# Patient Record
Sex: FEMALE | Race: ASIAN
[De-identification: names, ages, dates, MRNs, and addresses within clinical notes are randomized per-mention and may not be internally consistent; named-entity substitution may affect disease eponyms.]

---

## 2018-06-06 ENCOUNTER — HOSPITAL (OUTPATIENT)
Dept: OTHER | Age: 63
End: 2018-06-06

## 2019-07-27 ENCOUNTER — HOSPITAL (OUTPATIENT)
Dept: OTHER | Age: 64
End: 2019-07-27

## 2019-07-30 LAB — PATHOLOGIST NAME: NORMAL

## 2019-09-11 ENCOUNTER — HOSPITAL (OUTPATIENT)
Dept: OTHER | Age: 64
End: 2019-09-11
Attending: INTERNAL MEDICINE

## 2019-12-12 ENCOUNTER — HOSPITAL (OUTPATIENT)
Dept: OTHER | Age: 64
End: 2019-12-12

## 2020-05-14 ENCOUNTER — HOSPITAL ENCOUNTER (OUTPATIENT)
Dept: ULTRASOUND IMAGING | Age: 65
Discharge: HOME | End: 2020-05-14
Attending: FAMILY MEDICINE
Payer: COMMERCIAL

## 2020-05-14 DIAGNOSIS — R10.11 ABDOMINAL PAIN, RIGHT UPPER QUADRANT: ICD-10-CM

## 2020-05-14 PROCEDURE — 76705 ECHO EXAM OF ABDOMEN: CPT | Performed by: FAMILY MEDICINE

## 2020-09-16 ENCOUNTER — OFFICE VISIT (OUTPATIENT)
Dept: GASTROENTEROLOGY | Facility: CLINIC | Age: 65
End: 2020-09-16
Payer: COMMERCIAL

## 2020-09-16 VITALS
HEIGHT: 60 IN | TEMPERATURE: 99 F | DIASTOLIC BLOOD PRESSURE: 82 MMHG | BODY MASS INDEX: 26.9 KG/M2 | WEIGHT: 137 LBS | SYSTOLIC BLOOD PRESSURE: 130 MMHG

## 2020-09-16 DIAGNOSIS — K82.4 POLYP OF GALLBLADDER: ICD-10-CM

## 2020-09-16 DIAGNOSIS — R10.9 RIGHT-SIDED ABDOMINAL PAIN OF UNKNOWN CAUSE: ICD-10-CM

## 2020-09-16 DIAGNOSIS — R10.11 RUQ PAIN: Primary | ICD-10-CM

## 2020-09-16 PROCEDURE — 3008F BODY MASS INDEX DOCD: CPT | Performed by: INTERNAL MEDICINE

## 2020-09-16 PROCEDURE — 3079F DIAST BP 80-89 MM HG: CPT | Performed by: INTERNAL MEDICINE

## 2020-09-16 PROCEDURE — 99244 OFF/OP CNSLTJ NEW/EST MOD 40: CPT | Performed by: INTERNAL MEDICINE

## 2020-09-16 PROCEDURE — 3075F SYST BP GE 130 - 139MM HG: CPT | Performed by: INTERNAL MEDICINE

## 2020-09-16 RX ORDER — ALENDRONATE SODIUM 70 MG/1
TABLET ORAL
COMMUNITY
Start: 2020-05-28

## 2020-09-16 RX ORDER — AMLODIPINE BESYLATE 5 MG/1
5 TABLET ORAL DAILY
COMMUNITY
Start: 2020-06-20

## 2020-09-16 RX ORDER — ATORVASTATIN CALCIUM 10 MG/1
10 TABLET, FILM COATED ORAL DAILY
COMMUNITY
Start: 2020-08-04

## 2020-09-16 NOTE — H&P
[facility] - Gastroenterology  Clinic History and Physical     Patient presents with:  Abdominal Pain: had US in May; intermittent      HPI:   Maggie Wells is a 76year old year-old female patient of Dr. Jack Roy, with history of HL, HTN, osteoporosis, pre rigors  EYES:  negative for diplopia   RESPIRATORY:  negative for severe shortness of breath  CARDIOVASCULAR:  negative for crushing sub-sternal chest pain  GASTROINTESTINAL:  see HPI  GENITOURINARY:  negative for dysuria or gross hematuria  INTEGUMENT/JUANI vessels where appropriate. Areas scanned: Targeted sonographic interrogation of the right upper quadrant was performed. FINDINGS:          LIVER:               Normal in size and parenchymal echogenicity with homogeneous echotexture.  The hepatic conto ABDOMEN+PELVIS(CONTRAST ONLY)(CPT=74177);  Future  - CT ABDOMEN+PELVIS(CONTRAST ONLY)(CPT=74177)  Mild pain, short lived and not with food  Discussed if gallbladder polyp and pain should have lap angela but unclear  Will do H pylori and celiac labs and then

## 2020-09-16 NOTE — PATIENT INSTRUCTIONS
Request labs from Dr. Claudeen Peals celiac panel and H pylori  Discuss EGD but no red flag symptoms so will hold off and pain is lower  Given pain is lower abdomen and further evaluation of liver and gallbladder will plan CT abd/pelvis with contrast

## 2020-09-17 ENCOUNTER — LAB ENCOUNTER (OUTPATIENT)
Dept: LAB | Age: 65
End: 2020-09-17
Attending: INTERNAL MEDICINE
Payer: COMMERCIAL

## 2020-09-17 DIAGNOSIS — R10.11 RUQ PAIN: ICD-10-CM

## 2020-09-17 LAB — IGA SERPL-MCNC: 234 MG/DL (ref 70–312)

## 2020-09-17 PROCEDURE — 82784 ASSAY IGA/IGD/IGG/IGM EACH: CPT

## 2020-09-17 PROCEDURE — 36415 COLL VENOUS BLD VENIPUNCTURE: CPT

## 2020-09-17 PROCEDURE — 83516 IMMUNOASSAY NONANTIBODY: CPT

## 2020-09-18 ENCOUNTER — LAB ENCOUNTER (OUTPATIENT)
Dept: LAB | Age: 65
End: 2020-09-18
Attending: INTERNAL MEDICINE
Payer: COMMERCIAL

## 2020-09-18 ENCOUNTER — TELEPHONE (OUTPATIENT)
Dept: GASTROENTEROLOGY | Facility: CLINIC | Age: 65
End: 2020-09-18

## 2020-09-18 DIAGNOSIS — R10.11 RUQ PAIN: ICD-10-CM

## 2020-09-18 LAB — TTG IGA SER-ACNC: 0.3 U/ML (ref ?–7)

## 2020-09-18 PROCEDURE — 87338 HPYLORI STOOL AG IA: CPT

## 2020-09-19 ENCOUNTER — PATIENT MESSAGE (OUTPATIENT)
Dept: GASTROENTEROLOGY | Facility: CLINIC | Age: 65
End: 2020-09-19

## 2020-09-19 LAB — H PYLORI AG STL QL IA: NEGATIVE

## 2020-09-21 ENCOUNTER — TELEPHONE (OUTPATIENT)
Dept: GASTROENTEROLOGY | Facility: CLINIC | Age: 65
End: 2020-09-21

## 2020-09-21 NOTE — TELEPHONE ENCOUNTER
I spoke to [facility] in [facility].     She is going to ask her manager Caleb Chavez about this pt's CT scan not being covered under [address]

## 2020-09-21 NOTE — TELEPHONE ENCOUNTER
Patient is calling in to follow up on a message she sent via [address] Hi Dr. Lucio Read, the provider- [address] is not under the network of my insurance.  In this case, if I use their service, I will charged 100% of the cost. I would rather av

## 2020-09-22 NOTE — TELEPHONE ENCOUNTER
From: Elham Primer  To: Tova Anne MD  Sent: 9/19/2020 12:03 PM CDT  Subject: Referral Request    Hi Dr. Oliverio Mcnamara, the provider- [address] [place] [address] is not under the network of my insurance.  In this case, if I use their service, I will charged 100% of the

## 2020-09-22 NOTE — TELEPHONE ENCOUNTER
From: Tabatha Ohara  To: Lucas Arguello MD  Sent: 9/19/2020 4:17 PM CDT  Subject: Test Results Question    I have a question about TISSUE TRANSGLUTAMINASE AB IGA resulted on 9/18/20 at 1:04 PM. Hi Dr. Jackson Amaya, may I know what it means? Thank you.

## 2020-09-24 ENCOUNTER — PATIENT MESSAGE (OUTPATIENT)
Dept: GASTROENTEROLOGY | Facility: CLINIC | Age: 65
End: 2020-09-24

## 2020-09-24 DIAGNOSIS — R10.11 RUQ PAIN: Primary | ICD-10-CM

## 2020-09-24 NOTE — TELEPHONE ENCOUNTER
From: Alfredo Caruso  To: John Basiilo MD  Sent: 9/24/2020 11:53 AM CDT  Subject: Referral Request    Hi /Tanner Shen has given me the list of their free standing imaging center and I inquired at [facility] in [facility]

## 2020-10-14 ENCOUNTER — TELEPHONE (OUTPATIENT)
Dept: GASTROENTEROLOGY | Facility: CLINIC | Age: 65
End: 2020-10-14

## 2020-10-15 ENCOUNTER — PATIENT MESSAGE (OUTPATIENT)
Dept: GASTROENTEROLOGY | Facility: CLINIC | Age: 65
End: 2020-10-15

## 2020-10-20 NOTE — TELEPHONE ENCOUNTER
From: Jose Flores  To: Josephine Reyes MD  Sent: 10/15/2020 10:18 PM CDT  Subject: Test Results Question    Hi Dr. Chela Conde,   I wonder if you have received the results of my creatinine serum test from [address] [place] [address]? If so. shall I proceed with my CT scan?

## 2020-11-02 ENCOUNTER — PATIENT MESSAGE (OUTPATIENT)
Dept: GASTROENTEROLOGY | Facility: CLINIC | Age: 65
End: 2020-11-02

## 2020-11-02 NOTE — TELEPHONE ENCOUNTER
From: Severa Adie  To: Merilyn Opitz, MD  Sent: 11/2/2020 12:51 PM CST  Subject: Test Results Question    Hi,  Just had my CT scan this morning. And they said the result be sent to you this afternoon.      Tyson Braxton

## 2020-11-03 ENCOUNTER — TELEPHONE (OUTPATIENT)
Dept: GASTROENTEROLOGY | Facility: CLINIC | Age: 65
End: 2020-11-03

## 2020-11-03 NOTE — TELEPHONE ENCOUNTER
1st reminder letter mailed out to patient regarding her overdue Imaging order;     CT ABDOMEN+PELVIS(CONTRAST ONLY)(CPT=74177)

## 2020-11-05 ENCOUNTER — TELEPHONE (OUTPATIENT)
Dept: GASTROENTEROLOGY | Facility: CLINIC | Age: 65
End: 2020-11-05

## 2020-11-12 ENCOUNTER — PATIENT MESSAGE (OUTPATIENT)
Dept: GASTROENTEROLOGY | Facility: CLINIC | Age: 65
End: 2020-11-12

## 2020-11-12 NOTE — TELEPHONE ENCOUNTER
Patient asking for CT results. Results were placed on your desk on 11/05/2020. Please advise.  Thank you

## 2020-11-12 NOTE — TELEPHONE ENCOUNTER
From: Royal Wynne  To: Jose C Diego MD  Sent: 11/12/2020 9:01 AM CST  Subject: Test Results Question    Hi Dr. Alina Estrada,   Just want to know if there's anything to do next with regards to my CT scan test results.  It's been almost 2 weeks I haven't heard from

## 2021-04-12 ENCOUNTER — PATIENT MESSAGE (OUTPATIENT)
Dept: GASTROENTEROLOGY | Facility: CLINIC | Age: 66
End: 2021-04-12

## 2021-04-12 NOTE — TELEPHONE ENCOUNTER
Dr. Kermit Goldberg-    See patients message below. Last office visit 09/16/2020. Patient requesting provider recommendations if further evaluation is needed based off her ongoing symptoms. Please advise if appropriate. Thank you!

## 2021-04-12 NOTE — TELEPHONE ENCOUNTER
From: Beatris Mitchell  To: Juan Xie MD  Sent: 4/12/2021 12:17 AM CDT  Subject: Non-Urgent Medical Question    Hi Dr. Tamir Crane,   This is Raymond Caller, one of your patients who came to see you due to pain on the lower right rib.  I underwent several tests includ

## 2021-06-22 ENCOUNTER — HOSPITAL ENCOUNTER (EMERGENCY)
Facility: HOSPITAL | Age: 66
Discharge: HOME | End: 2021-06-22
Attending: EMERGENCY MEDICINE
Payer: COMMERCIAL

## 2021-06-22 VITALS
RESPIRATION RATE: 16 BRPM | SYSTOLIC BLOOD PRESSURE: 144 MMHG | DIASTOLIC BLOOD PRESSURE: 71 MMHG | OXYGEN SATURATION: 99 % | TEMPERATURE: 98 F | HEART RATE: 66 BPM

## 2021-06-22 DIAGNOSIS — H81.10 BENIGN PAROXYSMAL POSITIONAL VERTIGO, UNSPECIFIED LATERALITY: Primary | ICD-10-CM

## 2021-06-22 PROCEDURE — 99283 EMERGENCY DEPT VISIT LOW MDM: CPT

## 2021-06-22 PROCEDURE — 93005 ELECTROCARDIOGRAM TRACING: CPT

## 2021-06-22 PROCEDURE — 93010 ELECTROCARDIOGRAM REPORT: CPT | Performed by: EMERGENCY MEDICINE

## 2021-06-22 RX ORDER — DIAZEPAM 10 MG/1
10 TABLET ORAL ONCE
Status: DISCONTINUED | OUTPATIENT
Start: 2021-06-22 | End: 2021-06-22

## 2021-06-22 RX ORDER — DIAZEPAM 5 MG/1
5 TABLET ORAL EVERY 8 HOURS PRN
Qty: 12 TABLET | Refills: 0 | Status: SHIPPED | OUTPATIENT
Start: 2021-06-22

## 2021-06-22 RX ORDER — DIAZEPAM 5 MG/1
5 TABLET ORAL ONCE
Status: COMPLETED | OUTPATIENT
Start: 2021-06-22 | End: 2021-06-22

## 2021-06-22 NOTE — ED INITIAL ASSESSMENT (HPI)
Room spinning dizziness with n/v began one hour ago. Pt denies pain, numbness/tingling, slurred speech, arm drift, or facial droop.

## 2021-06-22 NOTE — ED QUICK NOTES
Patient cleared for discharge by MD. Patient verbalizes understanding of discharge instructions and prescriptions. Patient reports feeling better upon discharge.

## 2021-06-23 NOTE — ED PROVIDER NOTES
Patient Seen in: [facility] AND [facility] Emergency Department    History   Patient presents with:  Dizziness  Nausea/vomiting      HPI    The patient presents to the ED complaining of onset of severe dizziness roughly an hour ago.   She states that symptoms sta arrival to the room.  Personal protective equipment including droplet mask, eye protection, and gloves were worn throughout the duration of the exam.  Handwashing was performed prior to and after the exam.  Stethoscope and any equipment used during my exami EKG n             Imaging Results Available and Reviewed while in ED: No results found.   ED Medications Administered:   Medications   diazepam (VALIUM) tab 5 mg (5 mg Oral Given 6/22/21 1538)         MDM      06/22/21  1432 06/22/21  1530 06/22/21  1545 06 [id number]  [phone]    Call in 1 day        Medications Prescribed:  Discharge Medication List as of 6/22/2021  4:41 PM    START taking these medications    diazepam 5 MG Oral Tab  Take 1 tablet (5 mg total) by mouth every 8 (eight) hours as needed (dizzin

## 2021-09-28 ENCOUNTER — HOSPITAL ENCOUNTER (OUTPATIENT)
Dept: BONE DENSITY | Age: 66
Discharge: HOME | End: 2021-09-28
Attending: FAMILY MEDICINE
Payer: COMMERCIAL

## 2021-09-28 ENCOUNTER — HOSPITAL ENCOUNTER (OUTPATIENT)
Dept: MAMMOGRAPHY | Age: 66
Discharge: HOME | End: 2021-09-28
Attending: FAMILY MEDICINE
Payer: COMMERCIAL

## 2021-09-28 DIAGNOSIS — Z12.31 ENCOUNTER FOR SCREENING MAMMOGRAM FOR MALIGNANT NEOPLASM OF BREAST: ICD-10-CM

## 2021-09-28 DIAGNOSIS — Z78.0 MENOPAUSE: ICD-10-CM

## 2021-09-28 PROCEDURE — 77080 DXA BONE DENSITY AXIAL: CPT | Performed by: FAMILY MEDICINE

## 2021-09-28 PROCEDURE — 77067 SCR MAMMO BI INCL CAD: CPT | Performed by: FAMILY MEDICINE

## 2021-09-28 PROCEDURE — 77063 BREAST TOMOSYNTHESIS BI: CPT | Performed by: FAMILY MEDICINE

## 2021-11-04 ENCOUNTER — HOSPITAL ENCOUNTER (OUTPATIENT)
Dept: ULTRASOUND IMAGING | Age: 66
Discharge: HOME | End: 2021-11-04
Attending: FAMILY MEDICINE
Payer: COMMERCIAL

## 2021-11-04 DIAGNOSIS — K82.4 GALLBLADDER POLYP: ICD-10-CM

## 2021-11-04 PROCEDURE — 76705 ECHO EXAM OF ABDOMEN: CPT | Performed by: FAMILY MEDICINE

## 2022-12-07 ENCOUNTER — HOSPITAL ENCOUNTER (OUTPATIENT)
Dept: ULTRASOUND IMAGING | Age: 67
Discharge: HOME | End: 2022-12-07
Attending: FAMILY MEDICINE
Payer: COMMERCIAL

## 2022-12-07 DIAGNOSIS — K82.4: ICD-10-CM

## 2022-12-07 PROCEDURE — 76705 ECHO EXAM OF ABDOMEN: CPT | Performed by: FAMILY MEDICINE

## 2023-05-26 ENCOUNTER — APPOINTMENT (OUTPATIENT)
Dept: OTHER | Facility: HOSPITAL | Age: 68
End: 2023-05-26
Attending: EMERGENCY MEDICINE

## 2023-05-31 ENCOUNTER — APPOINTMENT (OUTPATIENT)
Dept: OTHER | Facility: HOSPITAL | Age: 68
End: 2023-05-31
Attending: EMERGENCY MEDICINE

## 2023-06-07 ENCOUNTER — APPOINTMENT (OUTPATIENT)
Dept: OTHER | Facility: HOSPITAL | Age: 68
End: 2023-06-07
Attending: EMERGENCY MEDICINE

## 2023-06-08 ENCOUNTER — HOSPITAL ENCOUNTER (OUTPATIENT)
Dept: MAMMOGRAPHY | Age: 68
Discharge: HOME | End: 2023-06-08
Attending: FAMILY MEDICINE
Payer: COMMERCIAL

## 2023-06-08 DIAGNOSIS — Z12.31 ENCOUNTER FOR SCREENING MAMMOGRAM FOR MALIGNANT NEOPLASM OF BREAST: ICD-10-CM

## 2023-06-08 PROCEDURE — 77063 BREAST TOMOSYNTHESIS BI: CPT | Performed by: FAMILY MEDICINE

## 2023-06-08 PROCEDURE — 77067 SCR MAMMO BI INCL CAD: CPT | Performed by: FAMILY MEDICINE

## 2023-06-21 ENCOUNTER — OFFICE VISIT (OUTPATIENT)
Dept: OTHER | Facility: HOSPITAL | Age: 68
End: 2023-06-21
Attending: EMERGENCY MEDICINE

## 2023-06-21 DIAGNOSIS — R52 PAIN: Primary | ICD-10-CM

## 2023-06-22 ENCOUNTER — TELEPHONE (OUTPATIENT)
Dept: PHYSICAL THERAPY | Facility: HOSPITAL | Age: 68
End: 2023-06-22

## 2023-06-22 ENCOUNTER — ORDER TRANSCRIPTION (OUTPATIENT)
Dept: PHYSICAL THERAPY | Facility: HOSPITAL | Age: 68
End: 2023-06-22

## 2023-06-22 DIAGNOSIS — M54.50 LOW BACK PAIN: ICD-10-CM

## 2023-06-22 DIAGNOSIS — M25.512 PAIN IN LEFT SHOULDER: Primary | ICD-10-CM

## 2023-06-26 ENCOUNTER — TELEPHONE (OUTPATIENT)
Dept: PHYSICAL THERAPY | Facility: HOSPITAL | Age: 68
End: 2023-06-26

## 2023-06-28 ENCOUNTER — OFFICE VISIT (OUTPATIENT)
Dept: PHYSICAL THERAPY | Facility: HOSPITAL | Age: 68
End: 2023-06-28
Attending: EMERGENCY MEDICINE
Payer: COMMERCIAL

## 2023-06-28 DIAGNOSIS — M25.512 PAIN IN LEFT SHOULDER: Primary | ICD-10-CM

## 2023-06-28 DIAGNOSIS — M54.50 LOW BACK PAIN: ICD-10-CM

## 2023-06-28 PROCEDURE — 97110 THERAPEUTIC EXERCISES: CPT

## 2023-06-28 PROCEDURE — 97161 PT EVAL LOW COMPLEX 20 MIN: CPT

## 2023-06-29 ENCOUNTER — TELEPHONE (OUTPATIENT)
Dept: PHYSICAL THERAPY | Facility: HOSPITAL | Age: 68
End: 2023-06-29

## 2023-06-30 ENCOUNTER — OFFICE VISIT (OUTPATIENT)
Dept: PHYSICAL THERAPY | Facility: HOSPITAL | Age: 68
End: 2023-06-30
Attending: EMERGENCY MEDICINE
Payer: COMMERCIAL

## 2023-06-30 PROCEDURE — 97110 THERAPEUTIC EXERCISES: CPT | Performed by: PHYSICAL THERAPIST

## 2023-06-30 PROCEDURE — 97140 MANUAL THERAPY 1/> REGIONS: CPT | Performed by: PHYSICAL THERAPIST

## 2023-07-03 ENCOUNTER — OFFICE VISIT (OUTPATIENT)
Dept: PHYSICAL THERAPY | Facility: HOSPITAL | Age: 68
End: 2023-07-03
Attending: EMERGENCY MEDICINE
Payer: OTHER MISCELLANEOUS

## 2023-07-03 PROCEDURE — 97014 ELECTRIC STIMULATION THERAPY: CPT | Performed by: PHYSICAL THERAPIST

## 2023-07-03 PROCEDURE — 97140 MANUAL THERAPY 1/> REGIONS: CPT | Performed by: PHYSICAL THERAPIST

## 2023-07-03 PROCEDURE — 97110 THERAPEUTIC EXERCISES: CPT | Performed by: PHYSICAL THERAPIST

## 2023-07-06 ENCOUNTER — OFFICE VISIT (OUTPATIENT)
Dept: PHYSICAL THERAPY | Facility: HOSPITAL | Age: 68
End: 2023-07-06
Attending: EMERGENCY MEDICINE
Payer: OTHER MISCELLANEOUS

## 2023-07-06 PROCEDURE — 97110 THERAPEUTIC EXERCISES: CPT

## 2023-07-06 PROCEDURE — 97140 MANUAL THERAPY 1/> REGIONS: CPT

## 2023-07-06 NOTE — PROGRESS NOTES
Diagnosis:        L neck and shoulder pain    Referring Provider: Samara Gonzalez  Date of Evaluation:    6/28/23    Precautions:  None Next MD visit:   none scheduled  Date of Surgery: n/a   Insurance Primary/Secondary: [employer] / N/A     # Auth Visits:    10 min late           Subjective:   Was at work this morning and the drive to physical therapy took her 35 min. 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 Pain: 3/10      Objective: seen for treatment . Cervical Screen: cervical flexion wnl, cervical ext 50*, R rot 45* L 55, R SB 35 , L SB 30-pain     AROM: (* denotes performed with pain)  Shoulder    Flexion: R 180; L 170* (was: 160*)  Abduction: R 180; L 170* (was:160*)  ER: R 90; L 70  IR: R 90; L 80       Observation: Guarded with passive movement left UT w scapular elevation, FHP , Suboccipitals TTP       Assessment:   Decreased mm tightness after Manual therapy: with improved RR to 50 without pain. Added: KT for L UT decompression. Pt able to attain neutral posture after manual and verbal cueing. Goals:   1. Improve L shoulder ROM flexion to 180 degrees to allow patient to reach overhead shelf with more ease. 2. Improve L shoulder strength by 1/2 grade to allow patient to lift gallon of milk out of fridge with more ease. 3. Improve parascapular strength by 1/2 grade to promote improved posture and body mechanics with lifting and carrying up to 10 pounds    4. Decrease symptoms by 50% to allow patient to lay on L side without waking from pain. 5. Patient to demonstrate improved posture and body mechanics with lifting and carrying up to 10 pounds to bring in groceries. 6. Improve cervical ROM by 2cm in rotation and SB to allow patient to turn head and clear traffic with more ease. 7. Patient to be independent with HEP, improved posture, joint protection principles, and proper body mechanics.     Plan: Progress with strength ROM and STM to left shoulder, neck re-eval effectiveness of IFC   Date: 6/30/2023  TX#: 2/8 Date:               7/3/23  TX#: 3/ Date:       7/6/2023           TX#: 4/ Date:                 TX#: 5/ Date:    Tx#: 6/   UBE x5' UBE x5' UBE 3'F/3'B  L 1      MT:  STM to left UT, levators, supraspinatus, suboccipitals    Manual cervical traction x5 pulls 30 sec each   Side glides x multiple bouts gr 2 c4-c6   Cervical isometrics x multiple bouts sb each direction      MT:  STM to left UT, levators, supraspinatus, suboccipitals    Manual cervical traction x5 pulls 30 sec each   Side glides x multiple bouts gr 2 c4-c6   Cervical isometrics x multiple bouts sb each direction   Resisted shoulder shrugs x5  MT:  STM to left UT, levators, supraspinatus, suboccipitals , scalenes-sitting and supine  Manual cervical traction x5 pulls 30 sec each   Side glides x multiple bouts gr 2 c4-c6   Cervical isometrics x multiple bouts sb each direction   PROM-physiologic C-spine SB, rotation x10 ea      Wall washes x10   CW/CCW   Wall push ups x10     Gr TB Standing rows   Standing extension  IFC x15' w/ HP   High low sweep  x      Wall washes x10   CW/CCW   Wall push ups x10     Gr TB Standing rows   Standing extension  Ther Ex:   Roll ball up wall w OP x10  ER RTB R/L x10  Scap retraction + rows RTB x10  Wall washes x10   CW/CCW   Wall push ups x10   C-spine SB R/L x20 sec x3  C-spine rotation R/L 20 sec x3  C-spine rotation x10 R/L w chin tuck    Gr TB Standing rows   Standing extension     KT-L UT w instructions for removal     HEP: see patient instructions     Charges: Mtx1 15'  TEx2 25'       Total Timed Treatment: 40 min  Total Treatment Time: 40min

## 2023-07-10 ENCOUNTER — OFFICE VISIT (OUTPATIENT)
Dept: PHYSICAL THERAPY | Facility: HOSPITAL | Age: 68
End: 2023-07-10
Attending: FAMILY MEDICINE
Payer: OTHER MISCELLANEOUS

## 2023-07-10 PROCEDURE — 97110 THERAPEUTIC EXERCISES: CPT

## 2023-07-10 PROCEDURE — 97140 MANUAL THERAPY 1/> REGIONS: CPT

## 2023-07-10 NOTE — PROGRESS NOTES
Diagnosis:        L neck and shoulder pain    Referring Provider: Hieu Roe  Date of Evaluation:    6/28/23    Precautions:  None Next MD visit:   none scheduled  Date of Surgery: n/a   Insurance Primary/Secondary: [employer] / N/A     # Auth Visits: 12           Subjective:   Pt. States currently no pain. In morning does wake with some pain, that subsides with movement. HEP going well. Feels pain in significantly improving and will see her MD on Thursday. Pt. States driving to PT is too far from home. Taking her over 30 minutes to get here. Pt. States she will continue with her exercises at home. 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 Pt. States she may call to cancel remaining visits after she sees the MD. Advised patient that keeping a few more of her visits would be beneficial to complete her full course of PT so she can return to work without any pain or restrictions. Pain: 0/10 currently. Max 4-5/10 pain at worst with overhead shoulder movements. Objective: seen for treatment . Cervical Screen: cervical flexion wnl, cervical ext 50*, R rot 45* L 55, R SB 35 , L SB 30-pain     AROM: (* denotes performed with pain)  Shoulder    Flexion: R 180; L 170* (was: 160*)  Abduction: R 180; L 170* (was:160*)  ER: R 90; L 70  IR: R 90; L 80       Observation: Guarded with passive movement left UT w scapular elevation, FHP , Suboccipitals TTP       Assessment:   Good response to new exercises and MT. Decreased mm tightness after Manual therapy:   Pt able to attain neutral posture after manual and verbal cueing. Able to still provoke pain with L shoulder OH movements. Goals:   1. Improve L shoulder ROM flexion to 180 degrees to allow patient to reach overhead shelf with more ease. 2. Improve L shoulder strength by 1/2 grade to allow patient to lift gallon of milk out of fridge with more ease.     3. Improve parascapular strength by 1/2 grade to promote improved posture and body mechanics with lifting and carrying up to 10 pounds    4. Decrease symptoms by 50% to allow patient to lay on L side without waking from pain. 5. Patient to demonstrate improved posture and body mechanics with lifting and carrying up to 10 pounds to bring in groceries. 6. Improve cervical ROM by 2cm in rotation and SB to allow patient to turn head and clear traffic with more ease. 7. Patient to be independent with HEP, improved posture, joint protection principles, and proper body mechanics. Plan: Progress with strength ROM and STM to left shoulder, neck  Date: 6/30/2023  TX#: 2/8 Date:               7/3/23  TX#: 3/ Date:       7/6/2023           TX#: 4/ Date:    7/10/23             TX#: 5/12 Date:    Tx#: 6/   UBE x5' UBE x5' UBE 3'F/3'B  L 1  UBE 3'F3'B    MT:  STM to left UT, levators, supraspinatus, suboccipitals    Manual cervical traction x5 pulls 30 sec each   Side glides x multiple bouts gr 2 c4-c6   Cervical isometrics x multiple bouts sb each direction      MT:  STM to left UT, levators, supraspinatus, suboccipitals    Manual cervical traction x5 pulls 30 sec each   Side glides x multiple bouts gr 2 c4-c6   Cervical isometrics x multiple bouts sb each direction   Resisted shoulder shrugs x5  MT:  STM to left UT, levators, supraspinatus, suboccipitals , scalenes-sitting and supine  Manual cervical traction x5 pulls 30 sec each   Side glides x multiple bouts gr 2 c4-c6   Cervical isometrics x multiple bouts sb each direction   PROM-physiologic C-spine SB, rotation x10 ea  MT:  STM to left UT, levators, supraspinatus, suboccipitals , scalenes-sitting and supine  Manual cervical traction x5 pulls 30 sec each   Side glides x multiple bouts gr 2 c4-c6   Cervical isometrics x multiple bouts sb each direction   PROM-physiologic C-spine SB, rotation x10 ea     Wall washes x10   CW/CCW   Wall push ups x10     Gr TB Standing rows Standing extension  IFC x15' w/ HP   High low sweep  x      Wall washes x10   CW/CCW   Wall push ups x10     Gr TB Standing rows   Standing extension  Ther Ex:   Roll ball up wall w OP x10  ER RTB R/L x10  Scap retraction + rows RTB x10  Wall washes x10   CW/CCW   Wall push ups x10   C-spine SB R/L x20 sec x3  C-spine rotation R/L 20 sec x3  C-spine rotation x10 R/L w chin tuck    Gr TB Standing rows   Standing extension     KT-L UT w instructions for removal There ex  Wall wipes c SB x 10  Wall push ups c SB x10  Supine on foam beam  *chin tucks x 10  *bench press x 10  *OH flex x 10  *snow angles x 10  *flys x 10  *serratus punches x 10                Ice L shoulder/UT x 5 minutes    HEP: see patient instructions     Charges: Mtx1 15'  TEx2 25'       Total Timed Treatment: 40 min  Total Treatment Time: 45min

## 2023-07-13 ENCOUNTER — TELEPHONE (OUTPATIENT)
Dept: PHYSICAL THERAPY | Facility: HOSPITAL | Age: 68
End: 2023-07-13

## 2023-07-13 ENCOUNTER — APPOINTMENT (OUTPATIENT)
Dept: PHYSICAL THERAPY | Facility: HOSPITAL | Age: 68
End: 2023-07-13
Attending: EMERGENCY MEDICINE
Payer: OTHER MISCELLANEOUS

## 2023-07-18 ENCOUNTER — OFFICE VISIT (OUTPATIENT)
Dept: PHYSICAL THERAPY | Facility: HOSPITAL | Age: 68
End: 2023-07-18
Attending: EMERGENCY MEDICINE
Payer: OTHER MISCELLANEOUS

## 2023-07-18 PROCEDURE — 97110 THERAPEUTIC EXERCISES: CPT | Performed by: PHYSICAL THERAPIST

## 2023-07-18 PROCEDURE — 97140 MANUAL THERAPY 1/> REGIONS: CPT | Performed by: PHYSICAL THERAPIST

## 2023-07-18 NOTE — PROGRESS NOTES
Diagnosis:        L neck and shoulder  pain    Referring Provider: Duane Regan  Date of Evaluation:    6/28/23    Precautions:  None Next MD visit:   none scheduled  Date of Surgery: n/a   Insurance Primary/Secondary: Leonie Soto / N/A     # Auth Visits: 12           Subjective: Has reported an overall decrease in pain in her neck and shoudler. ADL\"s are less as well. Pain: 0/10 currently. Max 4-5/10 pain at worst with overhead shoulder movements. Objective: seen for treatment . Cervical Screen: cervical flexion wnl, cervical ext 50*, R rot 45* L 55, R SB 35 , L SB 30-pain     AROM: (* denotes performed with pain)  Shoulder    Flexion: R 180; L 170* (was: 160*)  Abduction: R 180; L 170* (was:160*)  ER: R 90; L 70  IR: R 90; L 80       Palpation with ttp along left first rib, and along UT, suboccipital bilaterally   Flossing to left UE without sig. Pain or difficulty       Assessment:   Subjectively is making excellent ROM and most importantly decreased pain along UT, levators left side. Goals:   1. Improve L shoulder ROM flexion to 180 degrees to allow patient to reach overhead shelf with more ease. 2. Improve L shoulder strength by 1/2 grade to allow patient to lift gallon of milk out of fridge with more ease. 3. Improve parascapular strength by 1/2 grade to promote improved posture and body mechanics with lifting and carrying up to 10 pounds    4. Decrease symptoms by 50% to allow patient to lay on L side without waking from pain. 5. Patient to demonstrate improved posture and body mechanics with lifting and carrying up to 10 pounds to bring in groceries. 6. Improve cervical ROM by 2cm in rotation and SB to allow patient to turn head and clear traffic with more ease. 7. Patient to be independent with HEP, improved posture, joint protection principles, and proper body mechanics.     Plan: Progress with strength ROM and STM to left shoulder, neck  Date: 6/30/2023  TX#: 2/8 Date: 7/3/23  TX#: 3/ Date:       7/6/2023           TX#: 4/ Date:    7/10/23             TX#: 5/12 Date: 7/18  Tx#: 6/   UBE x5' UBE x5' UBE 3'F/3'B  L 1  UBE 3'F3'B UBE 3'F3'B   MT:  STM to left UT, levators, supraspinatus, suboccipitals    Manual cervical traction x5 pulls 30 sec each   Side glides x multiple bouts gr 2 c4-c6   Cervical isometrics x multiple bouts sb each direction      MT:  STM to left UT, levators, supraspinatus, suboccipitals    Manual cervical traction x5 pulls 30 sec each   Side glides x multiple bouts gr 2 c4-c6   Cervical isometrics x multiple bouts sb each direction   Resisted shoulder shrugs x5  MT:  STM to left UT, levators, supraspinatus, suboccipitals , scalenes-sitting and supine  Manual cervical traction x5 pulls 30 sec each   Side glides x multiple bouts gr 2 c4-c6   Cervical isometrics x multiple bouts sb each direction   PROM-physiologic C-spine SB, rotation x10 ea  MT:  STM to left UT, levators, supraspinatus, suboccipitals , scalenes-sitting and supine  Manual cervical traction x5 pulls 30 sec each   Side glides x multiple bouts gr 2 c4-c6   Cervical isometrics x multiple bouts sb each direction   PROM-physiologic C-spine SB, rotation x10 ea  MT:  STM to left UT, levators, supraspinatus, suboccipitals , scalenes-sitting and supine  Manual cervical traction x5 pulls 30 sec each   Side glides x multiple bouts gr 2 c4-c6   Cervical isometrics x multiple bouts sb each direction   PROM-physiologic C-spine SB, rotation x10 ea   Flossing x10 reps left UE (ULTT )    Wall washes x10   CW/CCW   Wall push ups x10     Gr TB Standing rows   Standing extension  IFC x15' w/ HP   High low sweep  x      Wall washes x10   CW/CCW   Wall push ups x10     Gr TB Standing rows   Standing extension  Ther Ex:   Roll ball up wall w OP x10  ER RTB R/L x10  Scap retraction + rows RTB x10  Wall washes x10   CW/CCW   Wall push ups x10   C-spine SB R/L x20 sec x3  C-spine rotation R/L 20 sec x3  C-spine rotation x10 R/L w chin tuck    Gr TB Standing rows   Standing extension     KT-L UT w instructions for removal There ex  Wall wipes c SB x 10  Wall push ups c SB x10  Supine on foam beam  *chin tucks x 10  *bench press x 10  *OH flex x 10  *snow angles x 10  *flys x 10  *serratus punches x 10                Ice L shoulder/UT x 5 minutes There ex  Wall wipes c SB x 10  Wall push ups c SB x10     HEP: see patient instructions     Charges: Mtx2 25'  TEx1 15'       Total Timed Treatment: 40 min  Total Treatment Time: 45min

## 2023-07-20 ENCOUNTER — APPOINTMENT (OUTPATIENT)
Dept: PHYSICAL THERAPY | Facility: HOSPITAL | Age: 68
End: 2023-07-20
Attending: EMERGENCY MEDICINE
Payer: OTHER MISCELLANEOUS

## 2023-07-25 ENCOUNTER — APPOINTMENT (OUTPATIENT)
Dept: PHYSICAL THERAPY | Facility: HOSPITAL | Age: 68
End: 2023-07-25
Attending: EMERGENCY MEDICINE
Payer: OTHER MISCELLANEOUS

## 2023-07-27 ENCOUNTER — APPOINTMENT (OUTPATIENT)
Dept: PHYSICAL THERAPY | Facility: HOSPITAL | Age: 68
End: 2023-07-27
Attending: EMERGENCY MEDICINE
Payer: OTHER MISCELLANEOUS

## 2023-07-31 ENCOUNTER — APPOINTMENT (OUTPATIENT)
Dept: OTHER | Facility: HOSPITAL | Age: 68
End: 2023-07-31
Attending: EMERGENCY MEDICINE

## 2023-08-08 ENCOUNTER — APPOINTMENT (OUTPATIENT)
Dept: PHYSICAL THERAPY | Facility: HOSPITAL | Age: 68
End: 2023-08-08
Payer: OTHER MISCELLANEOUS

## 2023-08-10 ENCOUNTER — APPOINTMENT (OUTPATIENT)
Dept: PHYSICAL THERAPY | Facility: HOSPITAL | Age: 68
End: 2023-08-10
Payer: OTHER MISCELLANEOUS

## 2023-08-14 ENCOUNTER — APPOINTMENT (OUTPATIENT)
Dept: PHYSICAL THERAPY | Facility: HOSPITAL | Age: 68
End: 2023-08-14
Payer: OTHER MISCELLANEOUS

## 2023-08-17 ENCOUNTER — APPOINTMENT (OUTPATIENT)
Dept: PHYSICAL THERAPY | Facility: HOSPITAL | Age: 68
End: 2023-08-17
Payer: OTHER MISCELLANEOUS

## 2023-10-09 ENCOUNTER — HOSPITAL ENCOUNTER (OUTPATIENT)
Dept: GENERAL RADIOLOGY | Age: 68
Discharge: HOME | End: 2023-10-09
Attending: FAMILY MEDICINE
Payer: COMMERCIAL

## 2023-10-09 DIAGNOSIS — J18.9 UNRESOLVED PNEUMONIA: ICD-10-CM

## 2023-10-09 PROCEDURE — 71046 X-RAY EXAM CHEST 2 VIEWS: CPT | Performed by: FAMILY MEDICINE

## 2023-11-08 ENCOUNTER — HOSPITAL ENCOUNTER (OUTPATIENT)
Dept: ULTRASOUND IMAGING | Age: 68
Discharge: HOME | End: 2023-11-08
Attending: FAMILY MEDICINE
Payer: COMMERCIAL

## 2023-11-08 DIAGNOSIS — K82.4 CHOLESTEROLOSIS OF GALLBLADDER: ICD-10-CM

## 2023-11-08 DIAGNOSIS — B18.1 HEPATITIS B CARRIER (HCC): ICD-10-CM

## 2023-11-08 PROCEDURE — 76705 ECHO EXAM OF ABDOMEN: CPT | Performed by: FAMILY MEDICINE

## 2023-11-09 ENCOUNTER — HOSPITAL ENCOUNTER (OUTPATIENT)
Dept: BONE DENSITY | Facility: HOSPITAL | Age: 68
Discharge: HOME | End: 2023-11-09
Attending: FAMILY MEDICINE
Payer: COMMERCIAL

## 2023-11-09 DIAGNOSIS — Z78.0 ASYMPTOMATIC MENOPAUSAL STATE: ICD-10-CM

## 2023-11-09 PROCEDURE — 77080 DXA BONE DENSITY AXIAL: CPT | Performed by: FAMILY MEDICINE

## (undated) NOTE — LETTER
11/03/20        Mica FOWLER [address]      Dear Dinorah Blake,    [facility] records indicate that you have outstanding lab work and or testing that was ordered for you and has not yet been completed:   CT ABDOMEN+PELVIS(CONTRAST ONLY)(CPT